# Patient Record
Sex: FEMALE | Race: WHITE | NOT HISPANIC OR LATINO | Employment: OTHER | URBAN - METROPOLITAN AREA
[De-identification: names, ages, dates, MRNs, and addresses within clinical notes are randomized per-mention and may not be internally consistent; named-entity substitution may affect disease eponyms.]

---

## 2020-02-03 ENCOUNTER — LAB REQUISITION (OUTPATIENT)
Dept: LAB | Facility: HOSPITAL | Age: 80
End: 2020-02-03

## 2020-02-03 DIAGNOSIS — Z00.00 ROUTINE GENERAL MEDICAL EXAMINATION AT A HEALTH CARE FACILITY: ICD-10-CM

## 2020-02-03 LAB
ALBUMIN SERPL-MCNC: 3.9 G/DL (ref 3.5–5.2)
ALBUMIN/GLOB SERPL: 1.6 G/DL
ALP SERPL-CCNC: 133 U/L (ref 39–117)
ALT SERPL W P-5'-P-CCNC: 102 U/L (ref 1–33)
ANION GAP SERPL CALCULATED.3IONS-SCNC: 12.3 MMOL/L (ref 5–15)
AST SERPL-CCNC: 65 U/L (ref 1–32)
BACTERIA UR QL AUTO: ABNORMAL /HPF
BILIRUB SERPL-MCNC: 0.2 MG/DL (ref 0.2–1.2)
BILIRUB UR QL STRIP: NEGATIVE
BUN BLD-MCNC: 14 MG/DL (ref 8–23)
BUN/CREAT SERPL: 20 (ref 7–25)
CALCIUM SPEC-SCNC: 10.1 MG/DL (ref 8.6–10.5)
CHLORIDE SERPL-SCNC: 98 MMOL/L (ref 98–107)
CLARITY UR: ABNORMAL
CO2 SERPL-SCNC: 28.7 MMOL/L (ref 22–29)
COLOR UR: YELLOW
CREAT BLD-MCNC: 0.7 MG/DL (ref 0.57–1)
DEPRECATED RDW RBC AUTO: 44.8 FL (ref 37–54)
ERYTHROCYTE [DISTWIDTH] IN BLOOD BY AUTOMATED COUNT: 12.4 % (ref 12.3–15.4)
GFR SERPL CREATININE-BSD FRML MDRD: 81 ML/MIN/1.73
GFR SERPL CREATININE-BSD FRML MDRD: 98 ML/MIN/1.73
GLOBULIN UR ELPH-MCNC: 2.4 GM/DL
GLUCOSE BLD-MCNC: 94 MG/DL (ref 65–99)
GLUCOSE UR STRIP-MCNC: NEGATIVE MG/DL
HCT VFR BLD AUTO: 39.1 % (ref 34–46.6)
HGB BLD-MCNC: 13.6 G/DL (ref 12–15.9)
HGB UR QL STRIP.AUTO: ABNORMAL
HYALINE CASTS UR QL AUTO: ABNORMAL /LPF
KETONES UR QL STRIP: NEGATIVE
LEUKOCYTE ESTERASE UR QL STRIP.AUTO: ABNORMAL
LYMPHOCYTES # BLD MANUAL: 0.51 10*3/MM3 (ref 0.7–3.1)
LYMPHOCYTES NFR BLD MANUAL: 12.2 % (ref 19.6–45.3)
LYMPHOCYTES NFR BLD MANUAL: 5.1 % (ref 5–12)
MCH RBC QN AUTO: 34.2 PG (ref 26.6–33)
MCHC RBC AUTO-ENTMCNC: 34.8 G/DL (ref 31.5–35.7)
MCV RBC AUTO: 98.2 FL (ref 79–97)
MONOCYTES # BLD AUTO: 0.22 10*3/MM3 (ref 0.1–0.9)
NEUTROPHILS # BLD AUTO: 3.49 10*3/MM3 (ref 1.7–7)
NEUTROPHILS NFR BLD MANUAL: 82.7 % (ref 42.7–76)
NITRITE UR QL STRIP: POSITIVE
PH UR STRIP.AUTO: 6.5 [PH] (ref 5–8)
PHENYTOIN SERPL-MCNC: 12.4 MCG/ML (ref 10–20)
PLAT MORPH BLD: NORMAL
PLATELET # BLD AUTO: 67 10*3/MM3 (ref 140–450)
PMV BLD AUTO: 10.4 FL (ref 6–12)
POTASSIUM BLD-SCNC: 3.9 MMOL/L (ref 3.5–5.2)
PROT SERPL-MCNC: 6.3 G/DL (ref 6–8.5)
PROT UR QL STRIP: ABNORMAL
RBC # BLD AUTO: 3.98 10*6/MM3 (ref 3.77–5.28)
RBC # UR: ABNORMAL /HPF
RBC MORPH BLD: NORMAL
REF LAB TEST METHOD: ABNORMAL
SODIUM BLD-SCNC: 139 MMOL/L (ref 136–145)
SP GR UR STRIP: 1.01 (ref 1–1.03)
SQUAMOUS #/AREA URNS HPF: ABNORMAL /HPF
UROBILINOGEN UR QL STRIP: ABNORMAL
WBC MORPH BLD: NORMAL
WBC NRBC COR # BLD: 4.22 10*3/MM3 (ref 3.4–10.8)
WBC UR QL AUTO: ABNORMAL /HPF

## 2020-02-03 PROCEDURE — 80053 COMPREHEN METABOLIC PANEL: CPT

## 2020-02-03 PROCEDURE — 80185 ASSAY OF PHENYTOIN TOTAL: CPT

## 2020-02-03 PROCEDURE — 85025 COMPLETE CBC W/AUTO DIFF WBC: CPT

## 2020-02-03 PROCEDURE — 81001 URINALYSIS AUTO W/SCOPE: CPT

## 2020-08-18 ENCOUNTER — OFFICE VISIT CONVERTED (OUTPATIENT)
Dept: SURGERY | Facility: CLINIC | Age: 80
End: 2020-08-18
Attending: SURGERY

## 2020-08-18 ENCOUNTER — CONVERSION ENCOUNTER (OUTPATIENT)
Dept: SURGERY | Facility: CLINIC | Age: 80
End: 2020-08-18

## 2020-09-09 ENCOUNTER — HOSPITAL ENCOUNTER (OUTPATIENT)
Dept: OTHER | Facility: HOSPITAL | Age: 80
Discharge: HOME | End: 2020-09-09
Attending: INTERNAL MEDICINE

## 2020-09-09 LAB — SARS-COV-2 RNA SPEC QL NAA+PROBE: NOT DETECTED

## 2020-09-14 ENCOUNTER — HOSPITAL ENCOUNTER (OUTPATIENT)
Dept: PERIOP | Facility: HOSPITAL | Age: 80
Setting detail: HOSPITAL OUTPATIENT SURGERY
Discharge: HOME | End: 2020-09-14
Attending: SURGERY

## 2020-09-14 LAB
GLUCOSE BLD-MCNC: 159 MG/DL (ref 65–99)
GLUCOSE BLD-MCNC: 68 MG/DL (ref 65–99)

## 2020-09-24 ENCOUNTER — OFFICE VISIT CONVERTED (OUTPATIENT)
Dept: SURGERY | Facility: CLINIC | Age: 80
End: 2020-09-24
Attending: SURGERY

## 2020-09-24 ENCOUNTER — CONVERSION ENCOUNTER (OUTPATIENT)
Dept: SURGERY | Facility: CLINIC | Age: 80
End: 2020-09-24

## 2021-05-10 NOTE — H&P
History and Physical      Patient Name: Violeta Shepherd   Patient ID: [id number]   Sex: Female   YOB: 1940    Primary Care Provider: Brock Angel MD   Referring Provider: Brock Angel MD    Visit Date: 2020    Provider: Richi Acosta MD   Location[facility]   Location Address: [address] [place] [address]  [id number]   Location Phone: [phone]          Chief Complaint  · Outpatient History & Physical / Surgical Orders  · Scalp Cyst      History Of Present Illness     Ms. Shepherd is a 79-year-old female who presents with a scalp cyst, which had been draining. It is nonhealing.       Past Medical History  Arthritis; Breast Cancer, Female; Breast lump; Breast Pain/Mastodynia; Cancer; Dementia; Heart disease; Hyperlipidemia; Hypertension; Osteopenia; Screening Examination for Breast Cancer         Past Surgical History  Breast; Cholecystectomy; Gallbladder; Hysterectomy; Lumpectomy, right breast; Tubal ligation; Tubal ligation         Medication List  buspirone 10 mg oral tablet; diclofenac sodium 1 % topical gel; donepezil 5 mg oral tablet; haloperidol 1 mg oral tablet; Klonopin 1 mg oral tablet; phenytoin 100 mg/4 mL oral suspension; potassium chloride 10 mEq oral tablet extended release; quetiapine 25 mg oral tablet; trazodone 50 mg oral tablet         Allergy List  NO KNOWN DRUG ALLERGIES         Family Medical History  Stroke; Heart Disease; Diabetes, unspecified type; -Father's Family History Unknown; -Mother's Family History Unknown         Reproductive History   0 Para 0 0 0 0       Social History  Alcohol (Never); ; Sedentary; Tobacco (Never)         Review of Systems  · Cardiovascular  o Denies  o : chest pain on exertion, shortness of breath, lower extremity swelling  · Respiratory  o Denies  o : wheezing, chronic cough, coughing up blood  · Gastrointestinal  o Denies  o : diarrhea, chronic abdominal pain, reflux  "symptoms      Vitals  Date Time BP Position Site L\R Cuff Size HR RR TEMP (F) WT  HT  BMI kg/m2 BSA m2 O2 Sat HC       08/18/2020 11:32 AM       14  150lbs 0oz 5'  7\" 23.49 1.79           Physical Examination  · Constitutional  o Appearance  o : reveals patient to be in no acute distress  · Head and Face  o HEENT  o : shows sclera to be nonicteric  · Respiratory  o Respiratory  o : chest is clear  · Cardiovascular  o Heart  o : regular rate and rhythm without murmurs, gallops or rubs  · Gastrointestinal  o Abdominal Examination  o :   § Abdomen  § : abdomen is soft and nontender, bowel sounds are present, no masses, gaurding or rebound were noted   · Musculoskeletal  o Extremeties/Joint  o : extremities show a ful range of motion  · Neurologic  o Neurologic/Reflexes  o : intact          Assessment  · Pre-op testing     V72.84/Z01.818  · Scalp cyst     706.2/L72.9      Plan  · Orders  o [facility] Pre-Op Covid-19 Screening (28150) - V72.84/Z01.818 - 09/09/2020 9/9/20 @10:15am. [address]  o Surgery Order (GENOR) - 706.2/L72.9 - 09/14/2020  · Instructions  o PLAN:  o Handouts Provided-Pre-Procedure Instructions including date and time and location of procedure.  o Surgical Facility: [facility]  o ****Surgical Orders****  o RISK AND BENEFITS:  o Consent for surgery: Given these options, the patient has verbally expressed an understanding of the risks of surgery and finds these risks acceptable. We will proceed with surgery as soon as possible.  o Consult Anesthesia for any post operative block, or any pain management procedure deemed necessary by the anesthesiologist for adequate post-operative pain control.  o O.R. PREP: Per protocol  o IV: LR@ 75ml/hr  o PLEASE SIGN PERMIT FOR: Excision of scalp cyst  o Kefzol 1 gram IV on call to OR.  o The above History and Physical Examination has been completed within 30 days of admission.  o ****Patient Status****            Electronically Signed by: Lina" Franko-, -Author on August 19, 2020 10:51:39 AM  Electronically Co-signed by: Richi Acosta MD -Reviewer on August 24, 2020 04:12:08 PM

## 2021-05-13 NOTE — PROGRESS NOTES
Progress Note      Patient Name: Violeta Shepherd   Patient ID: [id number]   Sex: Female   YOB: 1940    Primary Care Provider: Brock Angel MD   Referring Provider: Brock Angel MD    Visit Date: 2020    Provider: Richi Acosta MD   Location: [facility]   Location Address: [address] [place] [address]  [id number]   Location Phone: [phone]          Chief Complaint  · Follow Up Surgery      History Of Present Illness     Ms. Shepherd is seen in follow-up status post excision of a scalp cyst. 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 This was discussed with her son. Secondary to her medical condition, re-excision will not be performed at this time.       Past Medical History  Arthritis; Breast Cancer, Female; Breast lump; Breast Pain/Mastodynia; Cancer; Dementia; Heart disease; Hyperlipidemia; Hypertension; Osteopenia; Screening Examination for Breast Cancer         Past Surgical History  Breast; Cholecystectomy; Gallbladder; Hysterectomy; Lumpectomy, right breast; Tubal ligation; Tubal ligation         Medication List  buspirone 10 mg oral tablet; diclofenac sodium 1 % topical gel; donepezil 5 mg oral tablet; haloperidol 1 mg oral tablet; Klonopin 1 mg oral tablet; phenytoin 100 mg/4 mL oral suspension; potassium chloride 10 mEq oral tablet extended release; quetiapine 25 mg oral tablet; trazodone 50 mg oral tablet         Allergy List  NO KNOWN DRUG ALLERGIES         Family Medical History  Stroke; Heart Disease; Diabetes, unspecified type; -Father's Family History Unknown; -Mother's Family History Unknown         Reproductive History   0 Para 0 0 0 0       Social History  Alcohol (Never); ; Sedentary; Tobacco (Never)         Immunizations  Name Date Admin   Influenza 11/15/2016   Influenza 2015   Influenza 10/01/2014   Prevnar 13 11/15/2016         Review of Systems  · Cardiovascular  o Denies  o : chest  "pain on exertion, shortness of breath, lower extremity swelling  · Respiratory  o Denies  o : wheezing, chronic cough, coughing up blood  · Gastrointestinal  o Denies  o : diarrhea, chronic abdominal pain, reflux symptoms      Vitals  Date Time BP Position Site L\R Cuff Size HR RR TEMP (F) WT  HT  BMI kg/m2 BSA m2 O2 Sat FR L/min FiO2        09/24/2020 02:08 PM       14  150lbs 0oz 5'  7\" 23.49 1.79                 Assessment  · Encounter for examination following surgery     V67.00/Z09      Plan  · Medications  o Medications have been Reconciled  o Transition of Care or Provider Policy     We will plan to watch the area. If recurrence should occur, we will plan excision at that time.             Electronically Signed by: Lina Abdul-, -Author on October 4, 2020 11:46:23 AM  Electronically Co-signed by: Richi Acosta MD -Reviewer on October 5, 2020 12:57:55 PM  "

## 2021-05-14 VITALS — WEIGHT: 150 LBS | HEIGHT: 67 IN | RESPIRATION RATE: 14 BRPM | BODY MASS INDEX: 23.54 KG/M2

## 2021-05-15 VITALS — WEIGHT: 150 LBS | RESPIRATION RATE: 14 BRPM | BODY MASS INDEX: 23.54 KG/M2 | HEIGHT: 67 IN

## 2021-08-14 ENCOUNTER — OUTSIDE FACILITY SERVICE (OUTPATIENT)
Dept: INTERNAL MEDICINE | Facility: CLINIC | Age: 81
End: 2021-08-14

## 2021-08-14 PROCEDURE — 99307 SBSQ NF CARE SF MDM 10: CPT | Performed by: INTERNAL MEDICINE

## 2021-10-14 ENCOUNTER — OUTSIDE FACILITY SERVICE (OUTPATIENT)
Dept: INTERNAL MEDICINE | Facility: CLINIC | Age: 81
End: 2021-10-14

## 2021-10-14 PROCEDURE — 99308 SBSQ NF CARE LOW MDM 20: CPT | Performed by: INTERNAL MEDICINE

## 2021-11-18 ENCOUNTER — OUTSIDE FACILITY SERVICE (OUTPATIENT)
Dept: INTERNAL MEDICINE | Facility: CLINIC | Age: 81
End: 2021-11-18

## 2021-11-18 PROCEDURE — 99308 SBSQ NF CARE LOW MDM 20: CPT | Performed by: INTERNAL MEDICINE

## 2022-01-13 ENCOUNTER — OUTSIDE FACILITY SERVICE (OUTPATIENT)
Dept: INTERNAL MEDICINE | Facility: CLINIC | Age: 82
End: 2022-01-13

## 2022-02-13 ENCOUNTER — OUTSIDE FACILITY SERVICE (OUTPATIENT)
Dept: INTERNAL MEDICINE | Facility: CLINIC | Age: 82
End: 2022-02-13

## 2022-02-21 ENCOUNTER — OUTSIDE FACILITY SERVICE (OUTPATIENT)
Dept: INTERNAL MEDICINE | Facility: CLINIC | Age: 82
End: 2022-02-21

## 2022-02-22 PROCEDURE — G0180 MD CERTIFICATION HHA PATIENT: HCPCS | Performed by: INTERNAL MEDICINE

## 2022-03-15 ENCOUNTER — OUTSIDE FACILITY SERVICE (OUTPATIENT)
Dept: INTERNAL MEDICINE | Facility: CLINIC | Age: 82
End: 2022-03-15

## 2022-03-15 PROCEDURE — 99308 SBSQ NF CARE LOW MDM 20: CPT | Performed by: INTERNAL MEDICINE

## 2022-04-14 ENCOUNTER — OUTSIDE FACILITY SERVICE (OUTPATIENT)
Dept: INTERNAL MEDICINE | Facility: CLINIC | Age: 82
End: 2022-04-14

## 2022-04-14 PROCEDURE — OUTSIDEPOS PR OUTSIDE POS PLACEHOLDER: Performed by: INTERNAL MEDICINE

## 2022-05-15 ENCOUNTER — OUTSIDE FACILITY SERVICE (OUTPATIENT)
Dept: INTERNAL MEDICINE | Facility: CLINIC | Age: 82
End: 2022-05-15

## 2022-05-15 PROCEDURE — OUTSIDEPOS PR OUTSIDE POS PLACEHOLDER: Performed by: INTERNAL MEDICINE

## 2022-06-15 ENCOUNTER — NURSING HOME (OUTPATIENT)
Dept: INTERNAL MEDICINE | Facility: CLINIC | Age: 82
End: 2022-06-15

## 2022-06-15 DIAGNOSIS — R56.9 SEIZURE: ICD-10-CM

## 2022-06-15 DIAGNOSIS — E87.6 HYPOKALEMIA: ICD-10-CM

## 2022-06-15 DIAGNOSIS — F02.818 ALZHEIMER'S DEMENTIA OF OTHER ONSET WITH BEHAVIORAL DISTURBANCE: Primary | ICD-10-CM

## 2022-06-15 DIAGNOSIS — G30.8 ALZHEIMER'S DEMENTIA OF OTHER ONSET WITH BEHAVIORAL DISTURBANCE: Primary | ICD-10-CM

## 2022-06-15 PROBLEM — G30.9 DAT (DEMENTIA OF ALZHEIMER TYPE): Status: ACTIVE | Noted: 2022-06-15

## 2022-06-15 PROBLEM — F02.80 DAT (DEMENTIA OF ALZHEIMER TYPE): Status: ACTIVE | Noted: 2022-06-15

## 2022-06-15 PROCEDURE — 99308 SBSQ NF CARE LOW MDM 20: CPT | Performed by: INTERNAL MEDICINE

## 2022-06-15 NOTE — PROGRESS NOTES
Nursing Home Follow-Up Note      Brock Angel MD  []  [address]  [place] [address]  Phone: [phone]  Fax: [phone]     PATIENT NAME: Violeta Shepherd                                                                          YOB: 1940            DATE OF SERVICE: 06/15/2022  FACILITY:   [] [facility]   [x] [facility]  [] [facility]  [] Other      HISTORY OF PRESENT ILLNESS:   Patient seen for monthly rounds no complaints she does not verbalize much her eyes are open but she just stares straight ahead she does not really respond to my questioning, she does move her arms spontaneously but not her legs,    PAST MEDICAL & SURGICAL HISTORY:   Dementia with mood disorder  History of skin cancer  Seizure disorder  Hypokalemia  MEDICATIONS:  I have reviewed and reconciled the patients medication list in the patients chart at the skilled nursing facility today.        PHYSICAL EXAMINATION:   VITAL SIGNS: Temperature 97.6 sats 96% on room air, no recent blood pressures, pulse rate regular at about 75 respiratory rate 18    PHYSICAL EXAM:  Her neck is supple sclera nonicteric skin is pale throughout warm and dry no edema to the lower legs abdomen soft lungs are clear anteriorly and laterally cardiac exam reveals a regular rhythm, arms are folded up across her chest, mouth shows dry mucosa on the edge of the lips and tongue    RECORDS REVIEW:   Orders Reviewed.  Labs Reviewed.      ICD-10-CM ICD-9-CM   1. Alzheimer's dementia of other onset with behavioral disturbance (HCC)  G30.8 331.0    F02.81 294.11   2. Seizure (HCC)  R56.9 780.39   3. Hypokalemia  E87.6 276.8       ASSESSMENT/PLAN    Diagnoses and all orders for this visit:    1. Alzheimer's dementia of other onset with behavioral disturbance (HCC) (Primary)    2. Seizure (HCC)    3. Hypokalemia    Tenia of the groin folds.  Area, continues on nystatin  cream,  Dementia with mood disorder advanced, clinically stable  Seizure disorder continues on Dilantin 100 mg in the morning 200 bedtime  Hypokalemia continues on potassium replacement therapy  Mood disorder stable off antipsychotics  Palliative care mode is in place, labs are still ordered on a routine basis,    Brock Angel MD

## 2022-07-13 ENCOUNTER — NURSING HOME (OUTPATIENT)
Dept: INTERNAL MEDICINE | Facility: CLINIC | Age: 82
End: 2022-07-13

## 2022-07-13 DIAGNOSIS — R56.9 SEIZURE: ICD-10-CM

## 2022-07-13 DIAGNOSIS — F02.818 ALZHEIMER'S DEMENTIA OF OTHER ONSET WITH BEHAVIORAL DISTURBANCE: ICD-10-CM

## 2022-07-13 DIAGNOSIS — M19.90 ARTHRITIS: ICD-10-CM

## 2022-07-13 DIAGNOSIS — K59.09 OTHER CONSTIPATION: ICD-10-CM

## 2022-07-13 DIAGNOSIS — G30.8 ALZHEIMER'S DEMENTIA OF OTHER ONSET WITH BEHAVIORAL DISTURBANCE: ICD-10-CM

## 2022-07-13 DIAGNOSIS — E87.6 HYPOKALEMIA: Primary | ICD-10-CM

## 2022-07-13 PROCEDURE — 99308 SBSQ NF CARE LOW MDM 20: CPT | Performed by: INTERNAL MEDICINE

## 2022-07-13 NOTE — PROGRESS NOTES
Nursing Home Follow-Up Note      Brock Angel MD  []  [address], [address]  [place] [address]  Phone: [phone]  Fax: [phone]     PATIENT NAME: Violeta Shepherd                                                                          YOB: 1940            DATE OF SERVICE: 07/13/2022  FACILITY:   [] [facility]   [x] [facility]  [] [facility]  [] Other      HISTORY OF PRESENT ILLNESS: Patient was seen this morning early she was getting a bath bed bath by the aide, she had lotion all over she was pleasant she mumbles a few words there was no issues reported, no skin issues, she is pleasant confused at baseline with advanced dementia      PAST MEDICAL & SURGICAL HISTORY:     Dementia with mood disorder  History of skin cancer  Seizure disorder  Hypokalemia    MEDICATIONS:  I have reviewed and reconciled the patients medication list in the patients chart at the skilled nursing facility today.        PHYSICAL EXAMINATION:   VITAL SIGNS: 143/80, sat 97% on room air pulse of 68 respiratory rate 20    PHYSICAL EXAM: Neck is supple skin is moist throughout with lotion applied no edema lower legs are cool and dry otherwise, abdomen soft nontender cardiac exam regular rhythm 1/6 systolic murmur distant heart tones lungs are clear anteriorly and laterally, she is pleasant mouth shows dry mucosa, poor dentition, patient is awake arms are folded up across her chest with increased rigidity of the arms and muscles with attempts at extension, nonverbal essentially      RECORDS REVIEW:   Orders Reviewed.  Labs Reviewed.      ICD-10-CM ICD-9-CM   1. Hypokalemia  E87.6 276.8   2. Seizure (HCC)  R56.9 780.39   3. Alzheimer's dementia of other onset with behavioral disturbance (HCC)  G30.8 331.0    F02.81 294.11   4. Arthritis  M19.90 716.90   5. Other constipation  K59.09 564.09       ASSESSMENT/PLAN    Diagnoses and all orders  for this visit:    1. Hypokalemia (Primary)    2. Seizure (HCC)    3. Alzheimer's dementia of other onset with behavioral disturbance (HCC)    4. Arthritis    5. Other constipation        Tenia of the groin folds.  Area, continues on nystatin cream,  Dementia with mood disorder advanced, clinically stable  Seizure disorder continues on Dilantin 100 mg in the morning 200 bedtime  Hypokalemia continues on potassium replacement therapy  Mood disorder stable off antipsychotics  Arthritis, using ibuprofen and diclofenac for pain control arthritis pain,  Palliative care mode is in place, labs are still ordered on a routine basis,, April and October,  Constipation does have bowel regimen ordered,      Brock Angel MD

## 2022-08-13 ENCOUNTER — NURSING HOME (OUTPATIENT)
Dept: INTERNAL MEDICINE | Facility: CLINIC | Age: 82
End: 2022-08-13

## 2022-08-13 DIAGNOSIS — F02.818 ALZHEIMER'S DEMENTIA OF OTHER ONSET WITH BEHAVIORAL DISTURBANCE: ICD-10-CM

## 2022-08-13 DIAGNOSIS — K59.09 OTHER CONSTIPATION: ICD-10-CM

## 2022-08-13 DIAGNOSIS — M19.90 ARTHRITIS: Primary | ICD-10-CM

## 2022-08-13 DIAGNOSIS — R56.9 SEIZURE: ICD-10-CM

## 2022-08-13 DIAGNOSIS — G30.8 ALZHEIMER'S DEMENTIA OF OTHER ONSET WITH BEHAVIORAL DISTURBANCE: ICD-10-CM

## 2022-08-13 DIAGNOSIS — E87.6 HYPOKALEMIA: ICD-10-CM

## 2022-08-13 PROCEDURE — 99318 PR E/M ANNUAL NURSING FACILITY ASSESS STABLE 30 MIN: CPT | Performed by: INTERNAL MEDICINE

## 2022-08-13 NOTE — PROGRESS NOTES
[facility] annual h and p      Brock Angel MD  []  [address]  [place] [address]  Phone: [phone]  Fax: [phone]     PATIENT NAME: Violeta Shepherd                                                                          YOB: 1940            DATE OF SERVICE: 08/13/2022  FACILITY:   [] [facility]   [x] [facility]  [] [facility]  [] Other      HISTORY OF PRESENT ILLNESS: Patient was seen for her annual h and p at [facility], , she was in her room no complaints per nursing staff or issues ongoing, patient does not verbalize her eyes are open she just kind of moans or mumbles,    PAST MEDICAL & SURGICAL HISTORY:     Dementia with mood disorder by history resolved and improved over the past year or so,  Failure to thrive  Skin cancer top of the head  Arthritis continues on ibuprofen 600 twice a day and topical creams,  Seizure disorder  Hypokalemia  Constipation  MEDICATIONS:  I have reviewed and reconciled the patients medication list in the patients chart at the skilled nursing facility today.      Shx:  Has support from  and some family  Does not smoke     PHYSICAL EXAMINATION:   VITAL SIGNS: Blood pressure fluctuates but was 152/119 this morning August 13, 2022, sat was 100% on room air pulse 62 respiratory rate 18 temperature 97.8, previous blood pressure was 138/97, and 122/88 over the past week    PHYSICAL EXAM:, Her skin is dry throughout her eyes are open she is nonverbal her abdomen is soft her arms are folded up across her chest her lungs are clear anteriorly and laterally cardiac exam regular rhythm her lower legs showed no edema, skin is dry throughout, she had her legs crossed as usual, she was lying mostly flat without any respiratory compromise      RECORDS REVIEW:   Orders Reviewed.  Labs Reviewed.      ICD-10-CM ICD-9-CM   1. Arthritis  M19.90 716.90   2. Hypokalemia  E87.6 276.8    3. Other constipation  K59.09 564.09   4. Seizure (HCC)  R56.9 780.39   5. Alzheimer's dementia of other onset with behavioral disturbance (HCC)  G30.8 331.0    F02.81 294.11       ASSESSMENT/PLAN    Diagnoses and all orders for this visit:    1. Arthritis (Primary)    2. Hypokalemia    3. Other constipation    4. Seizure (HCC)    5. Alzheimer's dementia of other onset with behavioral disturbance (HCC)        Dementia with mood disorder advanced, clinically stable  Seizure disorder continues on Dilantin 100 mg in the morning 200 bedtime  Hypokalemia continues on potassium replacement therapy  Mood disorder stable off antipsychotics  Arthritis, using ibuprofen and diclofenac for pain control arthritis pain,  Palliative care mode is in place, labs are still ordered on a routine basis,, April and October,  Constipation does have bowel regimen ordered,, with laxatives including lactulose, senna,        Brock Angel MD

## 2022-09-15 ENCOUNTER — NURSING HOME (OUTPATIENT)
Dept: INTERNAL MEDICINE | Facility: CLINIC | Age: 82
End: 2022-09-15

## 2022-09-15 DIAGNOSIS — R56.9 SEIZURE: ICD-10-CM

## 2022-09-15 DIAGNOSIS — G30.8 ALZHEIMER'S DEMENTIA OF OTHER ONSET WITH BEHAVIORAL DISTURBANCE: ICD-10-CM

## 2022-09-15 DIAGNOSIS — F02.818 ALZHEIMER'S DEMENTIA OF OTHER ONSET WITH BEHAVIORAL DISTURBANCE: ICD-10-CM

## 2022-09-15 DIAGNOSIS — K59.09 OTHER CONSTIPATION: ICD-10-CM

## 2022-09-15 DIAGNOSIS — E87.6 HYPOKALEMIA: ICD-10-CM

## 2022-09-15 DIAGNOSIS — M19.90 ARTHRITIS: Primary | ICD-10-CM

## 2022-09-15 PROCEDURE — 99308 SBSQ NF CARE LOW MDM 20: CPT | Performed by: INTERNAL MEDICINE

## 2022-09-15 NOTE — PROGRESS NOTES
Nursing Home Follow-Up Note      Brock Angel MD  [x]  [address], [address]  [place] [address]  Phone: [phone]  Fax: [phone]     PATIENT NAME: Violeta Shepherd                                                                          YOB: 1940            DATE OF SERVICE: 09/15/2022  FACILITY:   [] [facility]   [x] [facility]  [] [facility]  [] Other      HISTORY OF PRESENT ILLNESS: Patient was seen today for monthly rounds she had no changes clinically no nursing issues reported, she was pleasant awake her eyes were open she is nonverbal, she still requires assistance with all ADLs and feeding,  PAST MEDICAL & SURGICAL HISTORY:   Dementia with history of mood disorder  Arthritis  Seizure disorder  Constipation  Hypokalemia  Failure to thrive    MEDICATIONS:  I have reviewed and reconciled the patients medication list in the patients chart at the skilled nursing facility today.        PHYSICAL EXAMINATION:   VITAL SIGNS: Temperature 97, sat 97% on room air, pulse rates regular in the 145/65, respiratory rate 18    PHYSICAL EXAM:, Skin is warm and dry no edema to the lower legs legs were crossed as usual under the blanket, she can uncross them spontaneously her abdomen soft nontender her lungs were clear anteriorly and laterally cardiac exam regular rhythm with distant heart tones 1/6 systolic murmur, no skin lesions of the top of her head, neck is supple, again she is nonverbal but eyes are open arms are folded across her chest,      RECORDS REVIEW:   Orders Reviewed.  Labs Reviewed.      ICD-10-CM ICD-9-CM   1. Arthritis  M19.90 716.90   2. Hypokalemia  E87.6 276.8   3. Seizure (HCC)  R56.9 780.39   4. Alzheimer's dementia of other onset with behavioral disturbance (HCC)  G30.8 331.0    F02.81 294.11   5. Other constipation  K59.09 564.09       ASSESSMENT/PLAN    Diagnoses and all orders for this  visit:    1. Arthritis (Primary)    2. Hypokalemia    3. Seizure (HCC)    4. Alzheimer's dementia of other onset with behavioral disturbance (HCC)    5. Other constipation        Dementia with mood disorder --advanced, clinically stable no real mood issues anymore, she has been on seizure medications and was taken off antipsychotic medications months ago and seems to be doing well    Seizure disorder continues on Dilantin 100 mg in the morning 200 bedtime, no reported seizures    Hypokalemia continues on potassium replacement therapy, no routine labs ordered    Mood disorder stable off antipsychotics    Arthritis, using ibuprofen and diclofenac for pain control arthritis pain    Palliative care mode is in place, labs have been stopped     constipation does have bowel regimen ordered,, with laxatives including lactulose, senna,    Sinew supportive care she is maintaining with assistance with eating etc.,      Brock Angel MD

## 2022-10-13 ENCOUNTER — NURSING HOME (OUTPATIENT)
Dept: INTERNAL MEDICINE | Facility: CLINIC | Age: 82
End: 2022-10-13

## 2022-10-13 DIAGNOSIS — E87.6 HYPOKALEMIA: Primary | ICD-10-CM

## 2022-10-13 DIAGNOSIS — G30.8 SEVERE ALZHEIMER'S DEMENTIA OF OTHER ONSET WITH MOOD DISTURBANCE: ICD-10-CM

## 2022-10-13 DIAGNOSIS — R56.9 SEIZURE: ICD-10-CM

## 2022-10-13 DIAGNOSIS — M19.90 ARTHRITIS: ICD-10-CM

## 2022-10-13 DIAGNOSIS — K59.09 OTHER CONSTIPATION: ICD-10-CM

## 2022-10-13 DIAGNOSIS — F02.C3 SEVERE ALZHEIMER'S DEMENTIA OF OTHER ONSET WITH MOOD DISTURBANCE: ICD-10-CM

## 2022-10-13 PROCEDURE — 99308 SBSQ NF CARE LOW MDM 20: CPT | Performed by: INTERNAL MEDICINE

## 2022-10-13 NOTE — PROGRESS NOTES
Nursing Home Follow-Up Note      Brock Angel MD  [x]  [address] [facility] [address]  [place] [address]  Phone: [phone]  Fax: [phone]     PATIENT NAME: Violeta Shepherd                                                                          YOB: 1940            DATE OF SERVICE: 10/13/2022  FACILITY:   [] [facility]   [x] [facility]  [] [facility]  [] Other      HISTORY OF PRESENT ILLNESS: Patient is being seen for monthly rounds no new issues reported, she is pleasant she actually answered simple question with a one-word answer when I ask her how she was feeling she said okay, she is in no distress      PAST MEDICAL & SURGICAL HISTORY:   Dementia with history of mood disorder  Arthritis  Seizure disorder  Hypokalemia  Failure to thrive  Constipation    MEDICATIONS:  I have reviewed and reconciled the patients medication list in the patients chart at the skilled nursing facility today.        PHYSICAL EXAMINATION:   VITAL SIGNS: 137/60, sat 98% on room air, pulse 62 respiratory rate 20, temperature 97.4    PHYSICAL EXAM: Her skin is dry she had no lesions on the top of her head as far as a skin ulcer, her neck is supple arms are folded across her chest she is awake her eyes are open her throat shows dry mucosa her lungs were clear anteriorly and laterally cardiac exam regular rhythm abdomen was soft nontender nondistended, lower extremities show no edema warm and dry she has her legs crossed most of the time when in bed, confused at baseline      RECORDS REVIEW:   Orders Reviewed.  Labs Reviewed.      ICD-10-CM ICD-9-CM   1. Hypokalemia  E87.6 276.8   2. Arthritis  M19.90 716.90   3. Other constipation  K59.09 564.09   4. Seizure (HCC)  R56.9 780.39   5. Severe Alzheimer's dementia of other onset with mood disturbance (HCC)  G30.8 331.0    F02.C3 294.11       ASSESSMENT/PLAN    Diagnoses and all orders for this  visit:    1. Hypokalemia (Primary)    2. Arthritis    3. Other constipation    4. Seizure (HCC)    5. Severe Alzheimer's dementia of other onset with mood disturbance (HCC)      Dementia with mood disorder --advanced, clinically stable, no real mood issues anymore, she has been on seizure medications and was taken off antipsychotic medications months ago and seems to be doing well    Seizure disorder continues on Dilantin 100 mg in the morning 200 bedtime, no reported seizures    Hypokalemia continues on potassium replacement therapy, no routine labs ordered    Mood disorder stable off antipsychotics    Arthritis, using ibuprofen and diclofenac for pain control arthritis pain    Palliative care mode is in place, labs have been stopped     constipation does have bowel regimen ordered,, with laxatives including lactulose, senna,    10 you supportive care     she is maintaining with assistance with eating etc.,        Brock Angel MD

## 2022-11-10 ENCOUNTER — NURSING HOME (OUTPATIENT)
Dept: INTERNAL MEDICINE | Facility: CLINIC | Age: 82
End: 2022-11-10

## 2022-11-10 DIAGNOSIS — R56.9 SEIZURE: ICD-10-CM

## 2022-11-10 DIAGNOSIS — G30.8 SEVERE ALZHEIMER'S DEMENTIA OF OTHER ONSET WITH MOOD DISTURBANCE: ICD-10-CM

## 2022-11-10 DIAGNOSIS — E87.6 HYPOKALEMIA: ICD-10-CM

## 2022-11-10 DIAGNOSIS — K59.09 OTHER CONSTIPATION: ICD-10-CM

## 2022-11-10 DIAGNOSIS — M19.90 ARTHRITIS: Primary | ICD-10-CM

## 2022-11-10 DIAGNOSIS — F02.C3 SEVERE ALZHEIMER'S DEMENTIA OF OTHER ONSET WITH MOOD DISTURBANCE: ICD-10-CM

## 2022-11-10 PROCEDURE — 99308 SBSQ NF CARE LOW MDM 20: CPT | Performed by: INTERNAL MEDICINE

## 2022-11-10 NOTE — PROGRESS NOTES
Nursing Home Follow-Up Note      Brock Angel MD  [x]  [address]  [place] [address]  Phone: [phone]  Fax: [phone]     PATIENT NAME: Violeta Shepherd                                                                          YOB: 1940            DATE OF SERVICE: 11/10/2022  FACILITY:   [] [facility]   [x] [facility]  [] [facility]  [] Other      HISTORY OF PRESENT ILLNESS: Patient was seen routine rounds no complaints no distress she did verbalize 1 word or 2 words when I ask her question but she is confused at baseline lying flat, no reported events      PAST MEDICAL & SURGICAL HISTORY:   Dementia  Mood disorder in the past improved  Failure to thrive  History of skin cancer on the top of the head  Arthritis diffusely  Seizure disorder  Constipation issues    MEDICATIONS:  I have reviewed and reconciled the patients medication list in the patients chart at the skilled nursing facility today.        PHYSICAL EXAMINATION:   VITAL SIGNS: 97.3, sat 98% on room air pulse rates regular respiratory rates around 18    PHYSICAL EXAM: Skin is dry throughout, mouth shows dry mucosa dry teeth sclera nonicteric she opens her eyes lying flat arms are folded across her chest her legs are crossed at the ankles her lower legs showed no edema skin turgor is poor throughout her abdomen soft her lungs are clear anteriorly with diminished breath sounds laterally and anteriorly cardiac exam regular rhythm      RECORDS REVIEW:   Orders Reviewed.  Labs Reviewed.      ICD-10-CM ICD-9-CM   1. Arthritis  M19.90 716.90   2. Hypokalemia  E87.6 276.8   3. Seizure (HCC)  R56.9 780.39   4. Severe Alzheimer's dementia of other onset with mood disturbance (HCC)  G30.8 331.0    F02.C3 294.11   5. Other constipation  K59.09 564.09       ASSESSMENT/PLAN    Diagnoses and all orders for this visit:    1. Arthritis (Primary)    2.  Hypokalemia    3. Seizure (HCC)    4. Severe Alzheimer's dementia of other onset with mood disturbance (HCC)    5. Other constipation      Dementia with mood disorder --advanced, clinically stable, no real mood issues anymore, she has been on seizure medications and was taken off antipsychotic medications months ago and seems to be doing well    Seizure disorder continues on Dilantin 100 mg in the morning 200 bedtime, no reported seizures    Hypokalemia continues on potassium replacement therapy, no routine labs ordered    Mood disorder stable off antipsychotics    Arthritis, using ibuprofen and diclofenac for pain control arthritis pain    Palliative care mode is in place, labs have been stopped     constipation --does have bowel regimen ordered,, with laxatives including lactulose, senna,    Continue supportive care     she is maintaining with assistance with eating etc.,      Brock Angel MD

## 2022-12-09 ENCOUNTER — NURSING HOME (OUTPATIENT)
Dept: INTERNAL MEDICINE | Facility: CLINIC | Age: 82
End: 2022-12-09

## 2022-12-09 DIAGNOSIS — K59.09 OTHER CONSTIPATION: ICD-10-CM

## 2022-12-09 DIAGNOSIS — G30.8 SEVERE ALZHEIMER'S DEMENTIA OF OTHER ONSET WITH MOOD DISTURBANCE: Primary | ICD-10-CM

## 2022-12-09 DIAGNOSIS — E87.6 HYPOKALEMIA: ICD-10-CM

## 2022-12-09 DIAGNOSIS — R56.9 SEIZURE: ICD-10-CM

## 2022-12-09 DIAGNOSIS — F02.C3 SEVERE ALZHEIMER'S DEMENTIA OF OTHER ONSET WITH MOOD DISTURBANCE: Primary | ICD-10-CM

## 2022-12-09 PROCEDURE — 99308 SBSQ NF CARE LOW MDM 20: CPT | Performed by: INTERNAL MEDICINE

## 2022-12-09 NOTE — PROGRESS NOTES
Nursing Home Follow-Up Note      Brock Angel MD  [x]  [address], [address]  [place] [address]  Phone: [phone]  Fax: [phone]     PATIENT NAME: Violeta Shepherd                                                                          YOB: 1940            DATE OF SERVICE: 12/09/2022  FACILITY:   [] [facility]   [x] [facility]  [] [facility]  [] Other      HISTORY OF PRESENT ILLNESS: Patient seen today no complaints she was asleep she aroused but does not verbalize her legs are crossed as usual, no new issues reported    PAST MEDICAL & SURGICAL HISTORY:   Advanced dementia  Mood disorder in the past improved  Failure to thrive  History of skin cancer on the top of the head  Arthritis diffusely  Seizure disorder  Constipation issues    MEDICATIONS:  I have reviewed and reconciled the patients medication list in the patients chart at the skilled nursing facility today.        PHYSICAL EXAMINATION:   VITAL SIGNS: 107/41 pulse of 64 respiratory rate of 16,    PHYSICAL EXAM: Her legs are crossed she has no edema her skin is dry pale throughout mouth is dry, she is nonverbal, she does open her eyes abdomen soft nondistended lungs are clear anteriorly and laterally with diminished breath sounds cardiac exam regular rhythm distant heart tones arms are folded across her chest,      RECORDS REVIEW:   Orders Reviewed.  Labs Reviewed.      ICD-10-CM ICD-9-CM   1. 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 Hypokalemia  E87.6 276.8   3. Seizure (HCC)  R56.9 780.39   4. Other constipation  K59.09 564.09       ASSESSMENT/PLAN    Diagnoses and all orders for this visit:    1. Severe Alzheimer's dementia of other onset with mood disturbance (HCC) (Primary)    2. Hypokalemia    3. Seizure (HCC)    4. Other constipation      Advanced dementia with history of mood  disorder --advanced, clinically stable, no real mood issues anymore, she has been on seizure medications and was taken off antipsychotic medications months ago and seems to be doing well, continue palliative end-of-life care, patient seems to maintain with eating and one-on-one assistance    Seizure disorder continues on Dilantin 100 mg in the morning 200 bedtime, no reported seizures    Hypokalemia continues on potassium replacement therapy, no routine labs ordered    Mood disorder stable off antipsychotics    Arthritis, using ibuprofen and diclofenac for pain control arthritis pain    Palliative care mode is in place, labs have been stopped     constipation --does have bowel regimen ordered,, with laxatives including lactulose, senna,    Continue supportive care     Brock Angel MD

## 2023-01-13 ENCOUNTER — NURSING HOME (OUTPATIENT)
Dept: INTERNAL MEDICINE | Facility: CLINIC | Age: 83
End: 2023-01-13
Payer: COMMERCIAL

## 2023-01-13 DIAGNOSIS — F02.C3 SEVERE ALZHEIMER'S DEMENTIA OF OTHER ONSET WITH MOOD DISTURBANCE: ICD-10-CM

## 2023-01-13 DIAGNOSIS — G30.8 SEVERE ALZHEIMER'S DEMENTIA OF OTHER ONSET WITH MOOD DISTURBANCE: ICD-10-CM

## 2023-01-13 DIAGNOSIS — K59.09 OTHER CONSTIPATION: ICD-10-CM

## 2023-01-13 DIAGNOSIS — M19.90 ARTHRITIS: Primary | ICD-10-CM

## 2023-01-13 DIAGNOSIS — R56.9 SEIZURE: ICD-10-CM

## 2023-01-13 DIAGNOSIS — E87.6 HYPOKALEMIA: ICD-10-CM

## 2023-01-13 PROCEDURE — 99308 SBSQ NF CARE LOW MDM 20: CPT | Performed by: INTERNAL MEDICINE

## 2023-01-13 NOTE — PROGRESS NOTES
Nursing Home Follow-Up Note      Brock Angel MD  [x]  [address], [address]  [place] [address]  Phone: [phone]  Fax: [phone]     PATIENT NAME: Violeta Shepherd                                                                          YOB: 1940            DATE OF SERVICE: 01/13/2023  FACILITY:   [] [facility]   [x] [facility]  [] [facility]  [] Other      HISTORY OF PRESENT ILLNESS: Patient is being seen for regular checkup, there are no new issues reported she seems to be doing okay otherwise, nurses report no new issues    PAST MEDICAL & SURGICAL HISTORY:     Mood disorder in the past improved  Failure to thrive  History of skin cancer on the top of the head  Arthritis diffusely  Seizure disorder  Constipation issues      MEDICATIONS:  I have reviewed and reconciled the patients medication list in the patients chart at the skilled nursing facility today.        PHYSICAL EXAMINATION:   VITAL SIGNS: 114/66 sat 96% on room air, pulse 76 respiratory rate 18 temperature 98.2    PHYSICAL EXAM:, Neck is supple she opens her eyes she mumbles a little bit her abdomen soft her arms are folded across her chest her lungs are clear cardiac exam reveals a regular rhythm her lower legs are crossed she has no edema skin is warm and dry throughout, she is lying flat without any respiratory compromise      RECORDS REVIEW:   Orders Reviewed.  Labs Reviewed.      ICD-10-CM ICD-9-CM   1. Arthritis  M19.90 716.90   2. Hypokalemia  E87.6 276.8   3. Other constipation  K59.09 564.09   4. Severe Alzheimer's dementia of other onset with mood disturbance (HCC)  G30.8 331.0    F02.C3 294.11   5. Seizure (HCC)  R56.9 780.39       ASSESSMENT/PLAN    Diagnoses and all orders for this visit:    1. Arthritis (Primary)    2. Hypokalemia    3. Other constipation    4. Severe Alzheimer's dementia of other onset with mood disturbance  ([facility])    5. Seizure (HCC)      Advanced dementia with history of mood disorder --advanced, clinically stable, no behavioral issues, no seizure activity,, she has been on seizure medications and was taken off antipsychotic medications months ago and seems to be doing well, continue palliative end-of-life care, patient seems to maintain with eating and one-on-one assistance, no recent lab studies    Seizure disorder continues on Dilantin 100 mg in the morning 200 bedtime, no reported seizures    Hypokalemia continues on potassium replacement therapy, no routine labs ordered    Mood disorder stable off antipsychotics    Arthritis, using ibuprofen and diclofenac for pain control arthritis pain    Palliative care mode is in place, labs have been stopped     constipation --does have bowel regimen ordered,, with laxatives including lactulose, senna,    Continue supportive care         Brock Angel MD

## 2023-02-13 ENCOUNTER — NURSING HOME (OUTPATIENT)
Dept: INTERNAL MEDICINE | Facility: CLINIC | Age: 83
End: 2023-02-13
Payer: COMMERCIAL

## 2023-02-13 DIAGNOSIS — R56.9 SEIZURE: Primary | ICD-10-CM

## 2023-02-13 DIAGNOSIS — F02.C3 SEVERE ALZHEIMER'S DEMENTIA OF OTHER ONSET WITH MOOD DISTURBANCE: ICD-10-CM

## 2023-02-13 DIAGNOSIS — E87.6 HYPOKALEMIA: ICD-10-CM

## 2023-02-13 DIAGNOSIS — G30.8 SEVERE ALZHEIMER'S DEMENTIA OF OTHER ONSET WITH MOOD DISTURBANCE: ICD-10-CM

## 2023-02-13 DIAGNOSIS — M19.90 ARTHRITIS: ICD-10-CM

## 2023-02-13 PROCEDURE — 99308 SBSQ NF CARE LOW MDM 20: CPT | Performed by: INTERNAL MEDICINE

## 2023-02-13 NOTE — PROGRESS NOTES
Nursing Home Follow-Up Note      Brock Angel MD  [x]  [address], [address]  [place] [address]  Phone: [phone]  Fax: [phone]     PATIENT NAME: iVoleta Shepherd                                                                          YOB: 1940            DATE OF SERVICE: 02/13/2023  FACILITY:   [] [facility]   [x] [facility]  [] [facility]  [] Other      HISTORY OF PRESENT ILLNESS: Patient was seen today for routine rounds, she does not open her eyes she was breathing comfortably actually snoring a little bit in bed when I walked in, her arms are folded across her chest her legs were crossed as usual,    PAST MEDICAL & SURGICAL HISTORY:   Mood disorder in the past improved  Failure to thrive  History of skin cancer on the top of the head  Arthritis diffusely  Seizure disorder  Constipation issues      MEDICATIONS:  I have reviewed and reconciled the patients medication list in the patients chart at the skilled nursing facility today.        PHYSICAL EXAMINATION:   VITAL SIGNS: 111/63 sat 95% on room air, pulse 75 respiratory rate 16 and temperature 99.2 on February 10, 2023    PHYSICAL EXAM: Given her skin is warm and dry she is nonverbal her eyes are closed she is breathing comfortably, mouth shows dry mucosa abdomen soft nondistended lungs are clear anteriorly and laterally cardiovascular shows a regular rhythm her lower legs showed no edema, her legs are crossed in bed her arms are folded up across her chest,      RECORDS REVIEW:   Orders Reviewed.  Labs Reviewed.      ICD-10-CM ICD-9-CM   1. Seizure (HCC)  R56.9 780.39   2. Severe Alzheimer's dementia of other onset with mood disturbance (HCC)  G30.8 331.0    F02.C3 294.11   3. Hypokalemia  E87.6 276.8   4. Arthritis  M19.90 716.90       ASSESSMENT/PLAN    Diagnoses and all orders for this visit:    1. Seizure (HCC) (Primary)    2. Severe Alzheimer's  dementia of other onset with mood disturbance (HCC)    3. Hypokalemia    4. Arthritis        Advanced dementia with history of mood disorder --advanced, clinically stable, no behavioral issues, no seizure activity,, she has been on seizure medications and was taken off antipsychotic medications months ago and seems to be doing well, continue palliative end-of-life care, patient seems to maintain with eating and one-on-one assistance, no recent lab studies, no change February 13, 2023    Seizure disorder continues on Dilantin 100 mg in the morning 200 bedtime, no reported seizures    Hypokalemia continues on potassium liquid, total of 20 mEq twice daily,, no routine labs ordered    Mood disorder stable off antipsychotics    Arthritis, using ibuprofen 600 mg twice a day, and diclofenac gel for pain control arthritis pain    Palliative care mode is in place, labs have been stopped     constipation --does have bowel regimen ordered,, with laxatives including lactulose, senna,    Continue supportive care, overall stable February 13, 2023      Brock Angel MD

## 2023-03-09 ENCOUNTER — NURSING HOME (OUTPATIENT)
Dept: INTERNAL MEDICINE | Facility: CLINIC | Age: 83
End: 2023-03-09
Payer: MEDICARE

## 2023-03-09 DIAGNOSIS — R56.9 SEIZURE: Primary | ICD-10-CM

## 2023-03-09 DIAGNOSIS — E87.6 HYPOKALEMIA: ICD-10-CM

## 2023-03-09 DIAGNOSIS — F02.C3 SEVERE ALZHEIMER'S DEMENTIA OF OTHER ONSET WITH MOOD DISTURBANCE: ICD-10-CM

## 2023-03-09 DIAGNOSIS — G30.8 SEVERE ALZHEIMER'S DEMENTIA OF OTHER ONSET WITH MOOD DISTURBANCE: ICD-10-CM

## 2023-03-09 PROCEDURE — 99308 SBSQ NF CARE LOW MDM 20: CPT | Performed by: INTERNAL MEDICINE

## 2023-03-09 NOTE — PROGRESS NOTES
Nursing Home Follow-Up Note      Brock Angel MD  [x]  [address], [address]  [place] [address]  Phone: [phone]  Fax: [phone]     PATIENT NAME: Violeta Shephedr                                                                          YOB: 1940            DATE OF SERVICE: 03/09/2023  FACILITY:   [] [facility]   [x] [facility]  [] [facility]  [] Other      HISTORY OF PRESENT ILLNESS: Patient being seen for monthly rounds, no new issues going on, she opens her eyes, she is nonverbal she is more palliative care, she is maintaining with feedings on one-to-one,      PAST MEDICAL & SURGICAL HISTORY:   Mood disorder in the past improved  Failure to thrive  History of skin cancer on the top of the head  Arthritis diffusely  Seizure disorder  Constipation issues      MEDICATIONS:  I have reviewed and reconciled the patients medication list in the patients chart at the skilled nursing facility today.        PHYSICAL EXAMINATION:   VITAL SIGNS: 122/80, pulse of 68 respiratory rate of 20 temperature 97.9    PHYSICAL EXAM: Her arms are folded across her chest her legs are crossed at the mid PreTAB regions she has no edema to her legs her abdomen is soft nontender her lungs are clear with diminished breath sounds, cardiac exam regular rhythm she is nonverbal she does open her eyes some to command her skin is very dry pale throughout,      RECORDS REVIEW:   Orders Reviewed.  Labs Reviewed.      ICD-10-CM ICD-9-CM   1. Seizure (HCC)  R56.9 780.39   2. Severe Alzheimer's dementia of other onset with mood disturbance (HCC)  G30.8 331.0    F02.C3 294.11   3. Hypokalemia  E87.6 276.8       ASSESSMENT/PLAN    Diagnoses and all orders for this visit:    1. Seizure (HCC) (Primary)    2. Severe Alzheimer's dementia of other onset with mood disturbance (HCC)    3. Hypokalemia      Advanced dementia with history of mood disorder  --advanced, clinically stable, no behavioral issues, no seizure activity,, off antipsychotics, more palliative care end-of-life,, maintains with eating and one-on-one assistance, no recent lab studies, no change 9/20/2023    Seizure disorder continues  Dilantin 100 mg in the morning 200 bedtime, no reported seizures    Hypokalemia continues on potassium liquid, total of 20 mEq twice daily,, no routine labs ordered    Mood disorder stable off antipsychotics    Arthritis, continues ibuprofen 600 mg twice a day, and diclofenac gel for pain control arthritis pain    Palliative care mode is in place, labs have been stopped     constipation --does have bowel regimen ordered,, with laxatives including lactulose, senna,    Continue supportive care, overall stable February 13, 2023        Brock Angel MD

## 2023-05-02 ENCOUNTER — NURSING HOME (OUTPATIENT)
Dept: INTERNAL MEDICINE | Facility: CLINIC | Age: 83
End: 2023-05-02
Payer: MEDICARE

## 2023-05-02 DIAGNOSIS — F02.C3 SEVERE ALZHEIMER'S DEMENTIA OF OTHER ONSET WITH MOOD DISTURBANCE: ICD-10-CM

## 2023-05-02 DIAGNOSIS — G30.8 SEVERE ALZHEIMER'S DEMENTIA OF OTHER ONSET WITH MOOD DISTURBANCE: ICD-10-CM

## 2023-05-02 DIAGNOSIS — E87.6 HYPOKALEMIA: ICD-10-CM

## 2023-05-02 DIAGNOSIS — K59.09 OTHER CONSTIPATION: ICD-10-CM

## 2023-05-02 DIAGNOSIS — R56.9 SEIZURE: Primary | ICD-10-CM

## 2023-05-02 NOTE — PROGRESS NOTES
Nursing Home Follow-Up Note      Brock Angel MD  [x]  [address], [address]  [place] [address]  Phone: [phone]  Fax: [phone]     PATIENT NAME: Violeta Shepherd                                                                          YOB: 1940            DATE OF SERVICE: 05/02/2023  FACILITY:   [x] [facility]   [] [facility]  [] [facility]  [] Other      HISTORY OF PRESENT ILLNESS: Patient is being seen for routine rounds, he is in no distress no changes they got pillows between her legs so she cannot cross her legs which she used to do at the other facility on a regular basis, she is pleasant her eyes are open she tries to mumble a few words but cannot get anything out, she is very frail and weak appearing in general which is unchanged from prior exams for the last 6 months or so    PAST MEDICAL & SURGICAL HISTORY:   Mood disorder in the past improved  Failure to thrive  History of skin cancer on the top of the head  Arthritis diffusely  Seizure disorder  Constipation issues       MEDICATIONS:  I have reviewed and reconciled the patients medication list in the patients chart at the skilled nursing facility today.        PHYSICAL EXAMINATION:   VITAL SIGNS: 143/81, pulse 66 respiratory rate 18 temperature 97.8 oxygen saturation is 90% on room air    PHYSICAL EXAM:, Her lower legs showed no edema her eyes are open she is nonverbal her abdomen is soft nondistended her lungs were clear with diminished breath sounds cardiac exam regular rhythm her neck is supple no adenopathy in the neck or supraclavicular areas, sclera nonicteric mouth shows dry mucosa around the lips, she had her arms folded across her chest and pillows between her legs at the ankles and knee areas,      RECORDS REVIEW:   Orders Reviewed.  Labs Reviewed.      ICD-10-CM ICD-9-CM   1. Seizure  R56.9 780.39   2. Severe Alzheimer's dementia of  other onset with mood disturbance  G30.8 331.0    F02.C3 294.11   3. Hypokalemia  E87.6 276.8   4. Other constipation  K59.09 564.09       ASSESSMENT/PLAN    Diagnoses and all orders for this visit:    1. Seizure (Primary)    2. Severe Alzheimer's dementia of other onset with mood disturbance    3. Hypokalemia    4. Other constipation        Advanced dementia with history of mood disorder --advanced, clinically stable, no behavioral issues, no seizure activity,, off antipsychotics, more palliative care end-of-life,, maintains with eating and one-on-one assistance, no recent lab studies, no michelle, may second, 2023    Seizure disorder continues  Dilantin 100 mg in the morning 200 bedtime, no reported seizures    Hypokalemia continues on potassium liquid, total of 20 mEq twice daily,, no routine labs ordered    Mood disorder stable off antipsychotics    Arthritis, continues ibuprofen 600 mg twice a day, and diclofenac gel for pain control arthritis pain    Palliative care mode is in place, labs have been stopped     constipation --does have bowel regimen ordered,, with laxatives including lactulose, senna,    Continue supportive care, overall stable May 2, 2023      Brock Angel MD

## 2023-05-28 ENCOUNTER — NURSING HOME (OUTPATIENT)
Dept: INTERNAL MEDICINE | Facility: CLINIC | Age: 83
End: 2023-05-28
Payer: MEDICARE

## 2023-05-28 DIAGNOSIS — R56.9 SEIZURE: Primary | ICD-10-CM

## 2023-05-28 DIAGNOSIS — F02.C3 SEVERE ALZHEIMER'S DEMENTIA OF OTHER ONSET WITH MOOD DISTURBANCE: ICD-10-CM

## 2023-05-28 DIAGNOSIS — G30.8 SEVERE ALZHEIMER'S DEMENTIA OF OTHER ONSET WITH MOOD DISTURBANCE: ICD-10-CM

## 2023-05-28 NOTE — PROGRESS NOTES
Nursing Home Follow-Up Note      Brock Angel MD  [x]  [address], [address]  [place] [address]  Phone: [phone]  Fax: [phone]     PATIENT NAME: Violeta Shepherd                                                                          YOB: 1940            DATE OF SERVICE: 05/28/2023  FACILITY:   [x] [facility]   [] [facility]  [] [facility]  [] Other      HISTORY OF PRESENT ILLNESS: Patient was seen today for routine rounds she had no complaints but she does not verbalize she is present with her eyes open, her arms are folded across her chest, there is no reported new issues,    PAST MEDICAL & SURGICAL HISTORY:   Dementia  Seizure disorder  Mood disorder with behavioral disturbances previously currently resolved      MEDICATIONS:  I have reviewed and reconciled the patients medication list in the patients chart at the skilled nursing facility today.        PHYSICAL EXAMINATION:   VITAL SIGNS: 120/71, sat 88% on room air weight 101.7, pulse 75 respiratory rate 16 and temperature 96.9    PHYSICAL EXAM: Her neck showed no adenopathy or swelling her eyes are open her sclera nonicteric she does not open her mouth her abdomen is soft nondistended her lower legs show no edema, arms are folded across her chest her lungs are clear with diminished breath sounds and her cardiac exam reveals a regular rhythm she is lying in bed flat without any respiratory compromise      RECORDS REVIEW:   Orders Reviewed.  Labs Reviewed.      ICD-10-CM ICD-9-CM   1. Seizure  R56.9 780.39   2. Severe Alzheimer's dementia of other onset with mood disturbance  G30.8 331.0    F02.C3 294.11       ASSESSMENT/PLAN    Diagnoses and all orders for this visit:    1. Seizure (Primary)    2. Severe Alzheimer's dementia of other onset with mood disturbance        Advanced dementia with history of mood disorder --advanced, clinically stable, no  behavioral issues, no seizure activity,, off antipsychotics, palliative care end-of-life,, maintains with eating and one-on-one assistance, no recent lab studies, stable, May 28, 2023    Seizure disorder continues  Dilantin 100 mg in the morning 200 bedtime, no reported seizures    Hypokalemia continues  potassium liquid, total of 20 mEq twice daily,, no routine labs ordered    Mood disorder stable off antipsychotics    Arthritis, continues ibuprofen 600 mg twice a day, and diclofenac gel for pain control arthritis pain    Palliative care mode is in place, labs have been stopped     constipation --does have bowel regimen ordered,, with laxatives including lactulose, senna,    Continue supportive care, overall stable May 28, 2023      Brock Angel MD

## 2023-08-02 ENCOUNTER — NURSING HOME (OUTPATIENT)
Dept: INTERNAL MEDICINE | Facility: CLINIC | Age: 83
End: 2023-08-02
Payer: COMMERCIAL

## 2023-08-02 DIAGNOSIS — G30.8 SEVERE ALZHEIMER'S DEMENTIA OF OTHER ONSET WITH MOOD DISTURBANCE: ICD-10-CM

## 2023-08-02 DIAGNOSIS — E87.6 HYPOKALEMIA: ICD-10-CM

## 2023-08-02 DIAGNOSIS — R54 FRAILTY SYNDROME IN GERIATRIC PATIENT: ICD-10-CM

## 2023-08-02 DIAGNOSIS — R56.9 SEIZURE: Primary | ICD-10-CM

## 2023-08-02 DIAGNOSIS — F02.C3 SEVERE ALZHEIMER'S DEMENTIA OF OTHER ONSET WITH MOOD DISTURBANCE: ICD-10-CM

## 2023-09-06 ENCOUNTER — NURSING HOME (OUTPATIENT)
Dept: INTERNAL MEDICINE | Facility: CLINIC | Age: 83
End: 2023-09-06
Payer: MEDICARE

## 2023-09-06 DIAGNOSIS — R56.9 SEIZURE: Primary | ICD-10-CM

## 2023-09-06 DIAGNOSIS — R54 FRAILTY SYNDROME IN GERIATRIC PATIENT: ICD-10-CM

## 2023-09-06 DIAGNOSIS — E87.6 HYPOKALEMIA: ICD-10-CM

## 2023-09-06 DIAGNOSIS — G30.8 SEVERE ALZHEIMER'S DEMENTIA OF OTHER ONSET WITH MOOD DISTURBANCE: ICD-10-CM

## 2023-09-06 DIAGNOSIS — F02.C3 SEVERE ALZHEIMER'S DEMENTIA OF OTHER ONSET WITH MOOD DISTURBANCE: ICD-10-CM

## 2023-09-06 NOTE — PROGRESS NOTES
Nursing Home Follow-Up Note      Brock Angel MD  [x]  [address], [address]  [place] [address]  Phone: [phone]  Fax: [phone]     PATIENT NAME: Violeta Shepherd                                                                          YOB: 1940            DATE OF SERVICE: 09/06/2023  FACILITY:   [x] [facility]   [] [facility]  [] [facility]  [] Other      HISTORY OF PRESENT ILLNESS: Patient was seen for routine rounds today, she was out in the lobby she is nonresponsive her eyes are always closed but they do get her up in the chair which is more than they were doing at the other facility which I think is very nice, there is been no reported issues except that they want to stop giving her her liquid potassium since she is not taking medications well and she is end-of-life care, and I agree      PAST MEDICAL & SURGICAL HISTORY:   Dementia with history of mood disorder  Seizure disorder  Hypokalemia by history  Arthritis, joint pains,    MEDICATIONS:  I have reviewed and reconciled the patients medication list in the patients chart at the skilled nursing facility today.        PHYSICAL EXAMINATION:   VITAL SIGNS: Vital signs of been stable as reported except that they are not checking blood pressures regularly no reported temperatures pulse rate regular respiratory rate appears to be relaxed at about 18 to 20 breaths/min    PHYSICAL EXAM: Patient's up in the chair she seems to be breathing comfortably her eyes are closed her arms are folded across her chest her abdomen is soft her lungs reveal diminished breath sounds and cardiac exam does reveal a regular rhythm she has no edema in her legs, she is nonverbal she is got advanced dementia      RECORDS REVIEW:   Orders Reviewed.  Labs Reviewed.      ICD-10-CM ICD-9-CM   1. Seizure  R56.9 780.39   2. Frailty syndrome in geriatric patient  R54 797   3. Severe  Alzheimer's dementia of other onset with mood disturbance  G30.8 331.0    F02.C3 294.11   4. Hypokalemia  E87.6 276.8       ASSESSMENT/PLAN    Diagnoses and all orders for this visit:    1. Seizure (Primary)    2. Frailty syndrome in geriatric patient    3. Severe Alzheimer's dementia of other onset with mood disturbance    4. Hypokalemia        Advanced dementia with history of mood disorder -clinically stable, no behavioral issues, no seizure activity,, off antipsychotics, palliative care end-of-life,, maintains with eating and one-on-one assistance, no recent lab studies, yoana, September 6, 2023, were stopping her potassium supplements at this point given her decreased ability to take an oral medications and even oral intake at times    Seizure disorder continues  Dilantin 100 mg in the morning 200 bedtime, no reported seizures    Hypokalemia, stopping   potassium liquid, total of 20 mEq twice daily,, no routine labs ordered, difficulty getting it in with no new labs ordered,    Mood disorder stable off antipsychotics    Arthritis, continues ibuprofen 600 mg twice a day, and diclofenac gel for pain control arthritis pain    Palliative care mode is in place, labs have been stopped     constipation --does have bowel regimen ordered,, with laxatives including lactulose, senna,, no reported issues by nursing June 30, 2023    Continue supportive care,     Brock Angel MD

## 2023-10-03 ENCOUNTER — NURSING HOME (OUTPATIENT)
Dept: INTERNAL MEDICINE | Facility: CLINIC | Age: 83
End: 2023-10-03
Payer: MEDICARE

## 2023-10-03 DIAGNOSIS — R56.9 SEIZURE: Primary | ICD-10-CM

## 2023-10-03 DIAGNOSIS — G30.8 SEVERE ALZHEIMER'S DEMENTIA OF OTHER ONSET WITH MOOD DISTURBANCE: ICD-10-CM

## 2023-10-03 DIAGNOSIS — F02.C3 SEVERE ALZHEIMER'S DEMENTIA OF OTHER ONSET WITH MOOD DISTURBANCE: ICD-10-CM

## 2023-10-03 DIAGNOSIS — R54 FRAILTY SYNDROME IN GERIATRIC PATIENT: ICD-10-CM

## 2023-10-03 DIAGNOSIS — Z51.5 END OF LIFE CARE: ICD-10-CM

## 2023-10-03 NOTE — PROGRESS NOTES
Nursing Home Follow-Up Note      Brock Angel MD  [x]  [address]  [place] [address]  Phone: [phone]  Fax: [phone]     PATIENT NAME: Violeta Shepherd                                                                          YOB: 1940            DATE OF SERVICE: 10/03/2023  FACILITY:   [x] [facility]   [] [facility]  [] [facility]  [] Other      HISTORY OF PRESENT ILLNESS: Patient was seen for routine rounds she is got her eyes open this morning she is in no distress, she has no communication, she has some upper airway breath sounds, audible breath sounds when she is breathing out, otherwise she is in no distress      PAST MEDICAL & SURGICAL HISTORY:   Dementia with history of mood disorder  Seizure disorder  Hypokalemia by history  Arthritis, joint pains,    MEDICATIONS:  I have reviewed and reconciled the patients medication list in the patients chart at the skilled nursing facility today.        PHYSICAL EXAMINATION:   VITAL SIGNS: 130/78, pulse 61 temperature 98.1 respiratory rate 16 sat 98% on room air,    PHYSICAL EXAM: She has some audible upper airway breath sounds, otherwise lungs are clear with inspiration cardiac exam regular rhythm her abdomen is soft her eyes are open she is nonverbal she stares to the wall, her lower legs showed no edema, arms are folded up across her chest      RECORDS REVIEW:   Orders Reviewed.  Labs Reviewed.      ICD-10-CM ICD-9-CM   1. Seizure  R56.9 780.39   2. Severe Alzheimer's dementia of other onset with mood disturbance  G30.8 331.0    F02.C3 294.11   3. Frailty syndrome in geriatric patient  R54 797   4. End of life care  Z51.5 V66.7       ASSESSMENT/PLAN    Diagnoses and all orders for this visit:    1. Seizure (Primary)    2. Severe Alzheimer's dementia of other onset with mood disturbance    3. Frailty syndrome in geriatric patient    4. End of life  care        Advanced dementia with history of mood disorder -clinically stable, no behavioral issues, no seizure activity,, off antipsychotics, palliative care end-of-life,, maintains with eating and one-on-one assistance, no recent lab studies,  stopping her potassium supplements September 2023     seizure disorder continues  Dilantin 100 mg in the morning 200 bedtime, no reported seizures    Hypokalemia, stopped potassium September 2023     Mood disorder stable off antipsychotics    Arthritis, continues ibuprofen 600 mg twice a day, and diclofenac gel for pain control arthritis pain    Palliative care mode is in place, labs have been stopped     constipation --does have bowel regimen ordered,, with laxatives including lactulose, senna,, no reported issues by nursing June 30, 2023    Continue supportive care,     Brock Angel MD

## 2023-11-30 ENCOUNTER — NURSING HOME (OUTPATIENT)
Dept: INTERNAL MEDICINE | Facility: CLINIC | Age: 83
End: 2023-11-30
Payer: MEDICARE

## 2023-11-30 DIAGNOSIS — G30.8 SEVERE ALZHEIMER'S DEMENTIA OF OTHER ONSET WITH MOOD DISTURBANCE: ICD-10-CM

## 2023-11-30 DIAGNOSIS — F02.C3 SEVERE ALZHEIMER'S DEMENTIA OF OTHER ONSET WITH MOOD DISTURBANCE: ICD-10-CM

## 2023-11-30 DIAGNOSIS — K59.09 OTHER CONSTIPATION: ICD-10-CM

## 2023-11-30 DIAGNOSIS — E87.6 HYPOKALEMIA: ICD-10-CM

## 2023-11-30 DIAGNOSIS — R56.9 SEIZURE: ICD-10-CM

## 2023-11-30 DIAGNOSIS — R54 FRAILTY SYNDROME IN GERIATRIC PATIENT: Primary | ICD-10-CM

## 2023-11-30 DIAGNOSIS — Z51.5 END OF LIFE CARE: ICD-10-CM

## 2023-11-30 NOTE — PROGRESS NOTES
Nursing Home Follow-Up Note      Brock Angel MD  [x]  [address], [address]  [place] [address]  Phone: [phone]  Fax: [phone]     PATIENT NAME: Violeta Shepherd                                                                          YOB: 1940            DATE OF SERVICE: 11/30/2023  FACILITY:   [x] [facility]   [] [facility]  [] [facility]  [] Other      HISTORY OF PRESENT ILLNESS: Patient was seen today for routine rounds no new issues reported patient is nonverbal eyes are closed no distress she is moving some of her arms and her legs spontaneously,      PAST MEDICAL & SURGICAL HISTORY:   Advanced dementia with history of mood disorder  Hypokalemia  Frailty    MEDICATIONS:  I have reviewed and reconciled the patients medication list in the patients chart at the skilled nursing facility today.        PHYSICAL EXAMINATION:   VITAL SIGNS: Temperature 97.6 respiratory rate 16 pulse 63 blood pressure 127/66 sat 97% on room air weight 215.4 pounds    PHYSICAL EXAM:, Very frail elderly female who is under palliative care, she is in no distress her arms are folded across her chest her legs are crossed at the ankles, she has no edema skin turgor is poor her abdomen is soft she is got a little bit of fullness in the lower abdominal area mostly consistent with bowel issue, lungs are clear laterally and anteriorly with diminished breath sounds cardiac exam regular rhythm and again nonverbal severely demented, eyes are closed      RECORDS REVIEW:   Orders Reviewed.  Labs Reviewed.      ICD-10-CM ICD-9-CM   1. Frailty syndrome in geriatric patient  R54 797   2. End of life care  Z51.5 V66.7   3. Seizure  R56.9 780.39   4. 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 Hypokalemia  E87.6 276.8   6. Other constipation  K59.09 564.09       ASSESSMENT/PLAN    Diagnoses  and all orders for this visit:    1. Frailty syndrome in geriatric patient (Primary)    2. End of life care    3. Seizure    4. 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 Hypokalemia    6. Other constipation        Advanced dementia with history of mood disorder -clinically stable, no behavioral issues, no seizure activity,, off antipsychotics, palliative care end-of-life,, maintains with eating and one-on-one assistance, no recent lab studies,  stopping her potassium supplements November 30, 2023    seizure disorder continues  Dilantin 100 mg in the morning 200 bedtime,, these are liquid dosing now, no reported seizures    Hypokalemia, stopped potassium September 2023     Mood disorder, stable off antipsychotics    Arthritis, continues ibuprofen 600 mg twice a day, and diclofenac gel for pain control arthritis pain    Palliative care mode is in place, labs have been stopped     constipation --does have bowel regimen ordered,, with laxatives including lactulose, senna,    Continue supportive care,   Brock Angel MD

## 2024-01-26 ENCOUNTER — NURSING HOME (OUTPATIENT)
Dept: INTERNAL MEDICINE | Facility: CLINIC | Age: 84
End: 2024-01-26
Payer: MEDICARE

## 2024-01-26 DIAGNOSIS — Z51.5 END OF LIFE CARE: ICD-10-CM

## 2024-01-26 DIAGNOSIS — F02.C3 SEVERE ALZHEIMER'S DEMENTIA OF OTHER ONSET WITH MOOD DISTURBANCE: ICD-10-CM

## 2024-01-26 DIAGNOSIS — E87.6 HYPOKALEMIA: ICD-10-CM

## 2024-01-26 DIAGNOSIS — R56.9 SEIZURE: ICD-10-CM

## 2024-01-26 DIAGNOSIS — G30.8 SEVERE ALZHEIMER'S DEMENTIA OF OTHER ONSET WITH MOOD DISTURBANCE: ICD-10-CM

## 2024-01-26 DIAGNOSIS — R54 FRAILTY SYNDROME IN GERIATRIC PATIENT: Primary | ICD-10-CM

## 2024-01-26 NOTE — PROGRESS NOTES
Nursing Home Follow-Up Note      Brock Angel MD  [x]  [address], [address]  [place] [address]  Phone: [phone]  Fax: [phone]     PATIENT NAME: Violeta Shepherd                                                                          YOB: 1940            DATE OF SERVICE: 01/26/2024  FACILITY:   [x] [facility]   [] [facility]  [] [facility]  [] Other      HISTORY OF PRESENT ILLNESS: Patient was seen today for routine rounds she has had no changes although she is more awake this morning than usual, her eyes were open she actually was mumbling a few words, she actually seem to move her arms and feet just a little bit during my exam on her own, she is in no distress      PAST MEDICAL & SURGICAL HISTORY:   Dementia with history of mood disorder behavioral issues all resolved  Failure to thrive  End-of-life care  Hypokalemia  Frailty  Seizure disorder    MEDICATIONS:  I have reviewed and reconciled the patients medication list in the patients chart at the skilled nursing facility today.        PHYSICAL EXAMINATION:   VITAL SIGNS: 115/70, respiratory rate 16 heart rate 80 temperature 97.2    PHYSICAL EXAM:, Her arms are folded across her chest she has no edema in her legs her abdomen is soft her lungs are clear anteriorly and laterally she has diminished breath sounds but her lungs are clear otherwise, cardiac exam regular rhythm, her neck is supple with no adenopathy in the supraclavicular or cervical area, skin is dry throughout, she has advanced dementia and cannot communicate well, her eyes are open      RECORDS REVIEW:   Orders Reviewed.  Labs Reviewed.      ICD-10-CM ICD-9-CM   1. Frailty syndrome in geriatric patient  R54 797   2. End of life care  Z51.5 V66.7   3. Severe Alzheimer's dementia of other onset with mood disturbance  G30.8 331.0    F02.C3 294.11   4. Hypokalemia  E87.6 276.8   5. Seizure   R56.9 780.39       ASSESSMENT/PLAN    Diagnoses and all orders for this visit:    1. Frailty syndrome in geriatric patient (Primary)    2. End of life care    3. Severe Alzheimer's dementia of other onset with mood disturbance    4. Hypokalemia    5. Seizure        Advanced dementia with history of mood disorder -clinically stable, no behavioral issues, no seizure activity,, off antipsychotics, palliative care continues, for end-of-life, management, maintains with eating and one-on-one assistance, no recent lab studies,  stopping her potassium supplements November 30, 2023    seizure disorder continues  Dilantin 100 mg in the morning 200 bedtime,, these are liquid dosing now, no reported seizures January 26, 2024    Hypokalemia, stopped potassium September 2023     Mood disorder, stable off antipsychotics    Arthritis, continues ibuprofen 600 mg twice a day, and diclofenac gel for pain control arthritis pain    Palliative care mode is in place, labs have been stopped     constipation --does have bowel regimen ordered,, with laxatives including lactulose, senna,    Continue supportive care,     Brock Angel MD

## 2024-03-28 ENCOUNTER — NURSING HOME (OUTPATIENT)
Dept: INTERNAL MEDICINE | Age: 84
End: 2024-03-28
Payer: MEDICARE

## 2024-03-28 DIAGNOSIS — R54 FRAILTY SYNDROME IN GERIATRIC PATIENT: Primary | ICD-10-CM

## 2024-03-28 DIAGNOSIS — Z51.5 END OF LIFE CARE: ICD-10-CM

## 2024-03-28 DIAGNOSIS — E87.6 HYPOKALEMIA: ICD-10-CM

## 2024-03-28 DIAGNOSIS — F02.C3 SEVERE ALZHEIMER'S DEMENTIA OF OTHER ONSET WITH MOOD DISTURBANCE: ICD-10-CM

## 2024-03-28 DIAGNOSIS — R56.9 SEIZURE: ICD-10-CM

## 2024-03-28 DIAGNOSIS — G30.8 SEVERE ALZHEIMER'S DEMENTIA OF OTHER ONSET WITH MOOD DISTURBANCE: ICD-10-CM

## 2024-03-28 PROCEDURE — 99309 SBSQ NF CARE MODERATE MDM 30: CPT | Performed by: INTERNAL MEDICINE

## 2024-03-28 NOTE — PROGRESS NOTES
Nursing Home Follow-Up Note      Brock Angel MD  [x]  [address], [address]  [place] [address]  Phone: [phone]  Fax: [phone]     PATIENT NAME: Violeta Shepherd                                                                          YOB: 1940            DATE OF SERVICE: 03/28/2024  FACILITY:   [x] [facility]   [] [facility]  [] [facility]  [] Other      HISTORY OF PRESENT ILLNESS: Patient was seen for follow-up, she is in her room she is in no distress she still under the palliative care path, there is been no reported issues and she has been maintaining by being fed one-to-one and eating okay,      PAST MEDICAL & SURGICAL HISTORY:   Advanced dementia, with a history of mood and behavioral issues  Failure to thrive  End-of-life care  Hypokalemia  Seizure disorder  History of skin cancer on the top of the head  MEDICATIONS:  I have reviewed and reconciled the patients medication list in the patients chart at the skilled nursing facility today.        PHYSICAL EXAMINATION:   VITAL SIGNS: Temperature 97.5 respiratory rate 16 pulse 71 blood pressure 166/82 sat 97% on room air weight 115.4 pounds,    PHYSICAL EXAM: She is a frail elderly female she is in her room her arms are folded her legs are crossed at the ankles which is chronic for her and not unusual she has no edema to her legs her abdomen is not distended it is soft her lungs are clear with diminished breath sounds she has a few rhonchi cardiac exam regular rhythm with distant heart tones her eyes are closed and she is nonverbal      RECORDS REVIEW:   Orders Reviewed.  Labs Reviewed.      ICD-10-CM ICD-9-CM   1. Frailty syndrome in geriatric patient  R54 797   2. End of life care  Z51.5 V66.7   3. Hypokalemia  E87.6 276.8   4. Seizure  R56.9 780.39   5. Severe Alzheimer's dementia of other onset with mood disturbance  G30.8 331.0    F02.C3 294.11        ASSESSMENT/PLAN    Diagnoses and all orders for this visit:    1. Frailty syndrome in geriatric patient (Primary)    2. End of life care    3. Hypokalemia    4. Seizure    5. Severe Alzheimer's dementia of other onset with mood disturbance        Advanced dementia with history of mood disorder -clinically stable, no behavioral issues, no seizure activity,, off antipsychotics, palliative care continues, for end-of-life, management, maintains with eating and one-on-one assistance, no recent lab studies,  stopping her potassium supplements November 30, 2023, clinically stable maintaining March 28, 2024    seizure disorder continues  Dilantin 100 mg in the morning 200 bedtime,, these are liquid dosing now, no reported seizures March 28, 2024    Hypokalemia, stopped potassium September 2023     Mood disorder, stable off antipsychotics    Arthritis, continues ibuprofen 600 mg twice a day, and diclofenac gel for pain control arthritis pain    Palliative care mode is in place, labs have been stopped     constipation --does have bowel regimen ordered,, with laxatives including lactulose, senna,    Continue supportive care,     Brock Angel MD

## 2024-05-30 ENCOUNTER — NURSING HOME (OUTPATIENT)
Dept: INTERNAL MEDICINE | Age: 84
End: 2024-05-30
Payer: MEDICARE

## 2024-05-30 DIAGNOSIS — G30.8 SEVERE ALZHEIMER'S DEMENTIA OF OTHER ONSET WITH MOOD DISTURBANCE: ICD-10-CM

## 2024-05-30 DIAGNOSIS — F02.C3 SEVERE ALZHEIMER'S DEMENTIA OF OTHER ONSET WITH MOOD DISTURBANCE: ICD-10-CM

## 2024-05-30 DIAGNOSIS — E87.6 HYPOKALEMIA: ICD-10-CM

## 2024-05-30 DIAGNOSIS — R54 FRAILTY SYNDROME IN GERIATRIC PATIENT: ICD-10-CM

## 2024-05-30 DIAGNOSIS — Z51.5 END OF LIFE CARE: Primary | ICD-10-CM

## 2024-05-30 DIAGNOSIS — R56.9 SEIZURE: ICD-10-CM

## 2024-05-30 PROCEDURE — 99308 SBSQ NF CARE LOW MDM 20: CPT | Performed by: INTERNAL MEDICINE

## 2024-05-30 NOTE — PROGRESS NOTES
Nursing Home Follow-Up Note      Brock Angel MD  [x]  [address], [address]  [place] [address]  Phone: [phone]  Fax: [phone]     PATIENT NAME: Violeta Shepherd                                                                          YOB: 1940            DATE OF SERVICE: 05/30/2024  FACILITY:   [x] [facility]   [] [facility]  [] [facility]  [] Other      HISTORY OF PRESENT ILLNESS: Patient is being seen for routine rounds she is nonverbal she has advanced dementia she is lying on her left side, she has a Evans catheter in place, no reported issues, orders reviewed      PAST MEDICAL & SURGICAL HISTORY:   Dementia, severe, nonverbal,  Urinary retention, Evans catheter  Hypokalemia  Seizure disorder  Mood disorder    MEDICATIONS:  I have reviewed and reconciled the patients medication list in the patients chart at the skilled nursing facility today.        PHYSICAL EXAMINATION:   VITAL SIGNS: 96.9 respiratory rate 20 pulse of 84 blood pressure 144/80 weight 215.4 pounds and sats 97% on room air    PHYSICAL EXAM: She is in bed she is lying on her left side she has a Evans catheter in place, her abdomen is soft nondistended no suprapubic fullness lower extremities no edema skin is very dry throughout, her lungs are clear with diminished breath sounds cardiac exam regular rhythm with distant heart tones her eyes are open she is staring straight ahead she does not verbalize arms are folded up across her chest and her skin turgor is poor throughout,      RECORDS REVIEW:   Orders Reviewed.  Labs Reviewed.      ICD-10-CM ICD-9-CM   1. End of life care  Z51.5 V66.7   2. Frailty syndrome in geriatric patient  R54 797   3. Seizure  R56.9 780.39   4. 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 Hypokalemia  E87.6 276.8       ASSESSMENT/PLAN    Diagnoses and all  orders for this visit:    1. End of life care (Primary)    2. Frailty syndrome in geriatric patient    3. Seizure    4. 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 Hypokalemia        Advanced dementia with history of mood disorder -clinically stable, no behavioral issues, no seizure activity,, off antipsychotics, palliative care continues, for end-of-life, management, maintains with eating and one-on-one assistance, no recent lab studies,  stopping her potassium supplements November 30, 2023, clinically stable maintaining May 30, 2024    seizure disorder continues  Dilantin 125 mg twice a day liquid dosing    Hypokalemia, stopped potassium September 2023     Mood disorder, stable off antipsychotics    Arthritis, continues ibuprofen 600 mg twice a day,     Palliative care mode is in place, labs have been stopped, continue end-of-life care supportive care    constipation --does have bowel regimen ordered,, with laxatives including lactulose, senna,    Brock Angel MD

## 2024-07-25 ENCOUNTER — NURSING HOME (OUTPATIENT)
Dept: INTERNAL MEDICINE | Age: 84
End: 2024-07-25
Payer: MEDICARE

## 2024-07-25 DIAGNOSIS — K59.09 OTHER CONSTIPATION: ICD-10-CM

## 2024-07-25 DIAGNOSIS — R54 FRAILTY SYNDROME IN GERIATRIC PATIENT: ICD-10-CM

## 2024-07-25 DIAGNOSIS — F02.C3 SEVERE ALZHEIMER'S DEMENTIA OF OTHER ONSET WITH MOOD DISTURBANCE: ICD-10-CM

## 2024-07-25 DIAGNOSIS — Z51.5 END OF LIFE CARE: Primary | ICD-10-CM

## 2024-07-25 DIAGNOSIS — R56.9 SEIZURE: ICD-10-CM

## 2024-07-25 DIAGNOSIS — G30.8 SEVERE ALZHEIMER'S DEMENTIA OF OTHER ONSET WITH MOOD DISTURBANCE: ICD-10-CM

## 2024-07-25 PROCEDURE — 99308 SBSQ NF CARE LOW MDM 20: CPT | Performed by: INTERNAL MEDICINE

## 2024-07-25 NOTE — PROGRESS NOTES
Nursing Home Follow-Up Note      Brock Angel MD  [x]  [address], [address]  [place] [address]  Phone: [phone]  Fax: [phone]     PATIENT NAME: Violeta Shepherd                                                                          YOB: 1940            DATE OF SERVICE: 07/25/2024  FACILITY:   [x] [facility]   [] [facility]  [] [facility]  [] Other      HISTORY OF PRESENT ILLNESS: Patient was seen today she is in good spirits no real complaints or reported issues by nursing staff, she is palliative type care but she is maintaining, she actually verbalizes a few words but is very confused with severe dementia of the Alzheimer's type      PAST MEDICAL & SURGICAL HISTORY:   Dementia  Hypokalemia  Mood disorder previously  History of seizures,  Failure to thrive    MEDICATIONS:  I have reviewed and reconciled the patients medication list in the patients chart at the skilled nursing facility today.        PHYSICAL EXAMINATION:   VITAL SIGNS: Temperature 96.4 respiratory rate 16 pulse 64 blood pressure 110/70 sat 98% on room air weight 115.4 pounds    PHYSICAL EXAM: On exam she is cachectic frail-appearing arms are folded across her chest she is nonverbal except she did open her eyes today when I started asking her questions and she actually was verbalizing her mumbling something that I could not understand, her neck is supple there is no adenopathy she is very frail cachectic with poor skin turgor throughout her abdomen is soft scaphoid her lungs are clear laterally and anteriorly with diminished breath sounds cardiac exam regular rhythm her lower legs showed no edema,, Evans catheter is in place, continues      RECORDS REVIEW:   Orders Reviewed.  Labs Reviewed.      ICD-10-CM ICD-9-CM   1. End of life care  Z51.5 V66.7   2. Frailty syndrome in geriatric patient  R54 797   3. Seizure  R56.9 780.39   4. Severe  Alzheimer's dementia of other onset with mood disturbance  G30.8 331.0    F02.C3 294.11   5. Other constipation  K59.09 564.09       ASSESSMENT/PLAN    Diagnoses and all orders for this visit:    1. End of life care (Primary)    2. Frailty syndrome in geriatric patient    3. Seizure    4. 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 Other constipation        Advanced dementia with history of mood disorder -clinically stable,  off antipsychotics, palliative care continues, maintains with eating and one-on-one assistance, no recent lab studies,    Immobilization, high risk for skin breakdown ulcers, continues Evans catheter    seizure disorder continues  Dilantin 125 mg twice a day liquid dosing    Hypokalemia, stopped potassium September 2023     Mood disorder, stable off antipsychotics    Arthritis, continues ibuprofen 600 mg twice a day,     Palliative care mode is in place, labs have been stopped, continue end-of-life care supportive care    constipation --does have bowel regimen ordered,, with laxatives including lactulose, liberty,    Brock Angel MD